# Patient Record
(demographics unavailable — no encounter records)

---

## 2025-02-12 NOTE — HISTORY OF PRESENT ILLNESS
[de-identified] : 2/12/25- AXEL HRENANDEZ is a 54 year old male presenting with lower back pain, no reported injury. Patient reports history of lower back pain that has worsened over time; intermittent N/T down the legs. Was in PT in 2024.

## 2025-02-12 NOTE — IMAGING
[de-identified] : LSPINE Inspection: No rash or ecchymosis Palpation: No tenderness to palpation or spasm in bilateral thoracic and lumbar paraspinal musculature, L SI joint tenderness to palpation ROM: limited all planes Strength: 5/5 bilateral hip flexors, knee extensors, ankle dorsiflexors, EHL, ankle plantarflexors Sensation: Sensation present to light touch bilateral L2-S1 distributions Provocative maneuvers: + L SLR  Bilateral hips- Palpation: No tenderness to palpation over greater trochanter or IT band ROM: No pain with flexion and internal rotation [Disc space narrowing] : Disc space narrowing [AP] : anteroposterior [There are no fractures, subluxations or dislocations. No significant abnormalities are seen] : There are no fractures, subluxations or dislocations. No significant abnormalities are seen

## 2025-02-12 NOTE — ASSESSMENT
[FreeTextEntry1] : PT, meds  Will discuss MRI  NSAIDs- Patient warned of risk of medication to GI tract, increased blood pressure, cardiac risk, and risk of fluid retention.  Advised to clear medication with internist or PCP if any concurrent health problem with heart, blood pressure, or GI system exists.  Gabapentin- Patient advised of sedating effects, instructed not to drive, operate machinery, or take with other sedating medications. Advised of need to taper on/off medication and risk of abruptly stopping gabapentin.

## 2025-03-28 NOTE — HISTORY OF PRESENT ILLNESS
[de-identified] : 2/12/25- AXEL HERNANDEZ is a 54 year old male presenting with lower back pain, no reported injury. Patient reports history of lower back pain that has worsened over time; intermittent N/T down the legs. Was in PT in 2024.  3/28/25 - here for a follow up, sxs still the same. States PT is not helping. Takes medications but it is hurting his stomach.

## 2025-03-28 NOTE — ASSESSMENT
[FreeTextEntry1] : No relief with PT and medication MRI L spine Follow up after MRI  NSAIDs- Patient warned of risk of medication to GI tract, increased blood pressure, cardiac risk, and risk of fluid retention.  Advised to clear medication with internist or PCP if any concurrent health problem with heart, blood pressure, or GI system exists.  Gabapentin- Patient advised of sedating effects, instructed not to drive, operate machinery, or take with other sedating medications. Advised of need to taper on/off medication and risk of abruptly stopping gabapentin.

## 2025-03-28 NOTE — IMAGING
[Disc space narrowing] : Disc space narrowing [AP] : anteroposterior [There are no fractures, subluxations or dislocations. No significant abnormalities are seen] : There are no fractures, subluxations or dislocations. No significant abnormalities are seen [de-identified] : LSPINE Inspection: No rash or ecchymosis Palpation: No tenderness to palpation or spasm in bilateral thoracic and lumbar paraspinal musculature, L SI joint tenderness to palpation ROM: limited all planes Strength: 5/5 bilateral hip flexors, knee extensors, ankle dorsiflexors, EHL, ankle plantarflexors Sensation: Sensation present to light touch bilateral L2-S1 distributions Provocative maneuvers: + L SLR  Bilateral hips- Palpation: No tenderness to palpation over greater trochanter or IT band ROM: No pain with flexion and internal rotation

## 2025-04-18 NOTE — HISTORY OF PRESENT ILLNESS
[de-identified] : 4/10/2025 Lumbar MRI  - report noted in chart.  Ind. 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 Patient reports history of lower back pain that has worsened over time; intermittent N/T down the legs. Was in PT in 2024.  3/28/25 - here for a follow up, sxs still the same. States PT is not helping. Takes medications but it is hurting his stomach.  4/18/25- MRI f/u. Continued pain, N/T down the LLE.   Occ: Construction (Self Employed)

## 2025-04-18 NOTE — ASSESSMENT
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  Advised to clear medication with internist or PCP if any concurrent health problem with heart, blood pressure, or GI system exists.  Gabapentin- Patient advised of sedating effects, instructed not to drive, operate machinery, or take with other sedating medications. Advised of need to taper on/off medication and risk of abruptly stopping gabapentin.

## 2025-04-18 NOTE — IMAGING
[Disc space narrowing] : Disc space narrowing [AP] : anteroposterior [There are no fractures, subluxations or dislocations. No significant abnormalities are seen] : There are no fractures, subluxations or dislocations. No significant abnormalities are seen [de-identified] : LSPINE Inspection: No rash or ecchymosis Palpation: No tenderness to palpation or spasm in bilateral thoracic and lumbar paraspinal musculature, L SI joint tenderness to palpation ROM: limited all planes Strength: 5/5 bilateral hip flexors, knee extensors, ankle dorsiflexors, EHL, ankle plantarflexors Sensation: Sensation present to light touch bilateral L2-S1 distributions Provocative maneuvers: + L SLR  Bilateral hips- Palpation: No tenderness to palpation over greater trochanter or IT band ROM: No pain with flexion and internal rotation

## 2025-05-28 NOTE — HISTORY OF PRESENT ILLNESS
[de-identified] : 4/10/2025 Lumbar MRI  - report noted in chart.  Ind. 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 Patient reports history of lower back pain that has worsened over time; intermittent N/T down the legs. Was in PT in 2024.  3/28/25 - here for a follow up, sxs still the same. States PT is not helping. Takes medications but it is hurting his stomach.  4/18/25- MRI f/u. Continued pain, N/T down the LLE.  5/23/25- here for follow up, continues to have low back and left lower extremity pain. He is taking gabapentin with no significant changes.   Occ: Construction (Self Employed)

## 2025-05-28 NOTE — IMAGING
[Disc space narrowing] : Disc space narrowing [AP] : anteroposterior [There are no fractures, subluxations or dislocations. No significant abnormalities are seen] : There are no fractures, subluxations or dislocations. No significant abnormalities are seen [de-identified] : LSPINE Inspection: No rash or ecchymosis Palpation: No tenderness to palpation or spasm in bilateral thoracic and lumbar paraspinal musculature, L SI joint tenderness to palpation ROM: limited all planes Strength: 5/5 bilateral hip flexors, knee extensors, ankle dorsiflexors, EHL, ankle plantarflexors Sensation: Sensation present to light touch bilateral L2-S1 distributions Provocative maneuvers: + L SLR  Bilateral hips- Palpation: No tenderness to palpation over greater trochanter or IT band ROM: No pain with flexion and internal rotation

## 2025-05-28 NOTE — HISTORY OF PRESENT ILLNESS
[de-identified] : 4/10/2025 Lumbar MRI  - report noted in chart.  Ind. 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 Patient reports history of lower back pain that has worsened over time; intermittent N/T down the legs. Was in PT in 2024.  3/28/25 - here for a follow up, sxs still the same. States PT is not helping. Takes medications but it is hurting his stomach.  4/18/25- MRI f/u. Continued pain, N/T down the LLE.  5/23/25- here for follow up, continues to have low back and left lower extremity pain. He is taking gabapentin with no significant changes.   Occ: Construction (Self Employed)

## 2025-05-28 NOTE — ASSESSMENT
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  Advised to clear medication with internist or PCP if any concurrent health problem with heart, blood pressure, or GI system exists.  Gabapentin- Patient advised of sedating effects, instructed not to drive, operate machinery, or take with other sedating medications. Advised of need to taper on/off medication and risk of abruptly stopping gabapentin.

## 2025-05-28 NOTE — IMAGING
[Disc space narrowing] : Disc space narrowing [AP] : anteroposterior [There are no fractures, subluxations or dislocations. No significant abnormalities are seen] : There are no fractures, subluxations or dislocations. No significant abnormalities are seen [de-identified] : LSPINE Inspection: No rash or ecchymosis Palpation: No tenderness to palpation or spasm in bilateral thoracic and lumbar paraspinal musculature, L SI joint tenderness to palpation ROM: limited all planes Strength: 5/5 bilateral hip flexors, knee extensors, ankle dorsiflexors, EHL, ankle plantarflexors Sensation: Sensation present to light touch bilateral L2-S1 distributions Provocative maneuvers: + L SLR  Bilateral hips- Palpation: No tenderness to palpation over greater trochanter or IT band ROM: No pain with flexion and internal rotation

## 2025-06-04 NOTE — DISCUSSION/SUMMARY
[de-identified] : AXEL HERNANDEZ is a 55 year-old man presenting for a NPV for a history of chronic low back pain.   Prior treatment: Physical therapy x28 sessions in 2025 Celecoxib Gabapentin Meloxicam Patient has participated and failed at least 6 weeks of conservative therapy including physical therapy and a prescribed home exercise program as well as 6 weeks of activity modifications, including heat, ice, rest, and over the counter medications.  Plan: 1) MRI lumbar spine images reviewed with the patient. 2) Discuss LEFT L5-S1, S1 TFESI w/o MAC. The procedure was explained to the patient in detail. Reviewed risks, benefits, and alternatives with the patient. Some risks discussed included temporary increase in pain, bleeding, infection, and side effects from steroids. The patient expressed understanding and would like to defer 2/2 concern for weight gain. 3) Continue celecoxib 200mg daily prn; denies AEs 4) Continue home exercise regimen 5) Patient would like to f/u with Dr. Alonzo to discuss possible discectomy 6) RTC prn

## 2025-06-04 NOTE — PHYSICAL EXAM
[de-identified] : Gen: NAD Head: NC/AT Eyes: no glasses, no scleral icterus ENT: mucous membranes moist CV: No JVD Lungs: nonlabored breathing Abd: soft, NT/ND Ext: full ROM in all extremities, no peripheral edema Back: +TTP in the left low lumbar facet region, +SLR on the LEFT Neuro: CN intact LEs +5 L +5 R hip flexion +5 L +5 R leg extension +5 L +5 R leg flexion +5 L +5 R foot dorsiflexion +5 L +5 R foot plantarflexion +5 L +5 R EHL extension Psych: normal affect Skin: no visible lesions

## 2025-06-04 NOTE — PHYSICAL EXAM
[de-identified] : Gen: NAD Head: NC/AT Eyes: no glasses, no scleral icterus ENT: mucous membranes moist CV: No JVD Lungs: nonlabored breathing Abd: soft, NT/ND Ext: full ROM in all extremities, no peripheral edema Back: +TTP in the left low lumbar facet region, +SLR on the LEFT Neuro: CN intact LEs +5 L +5 R hip flexion +5 L +5 R leg extension +5 L +5 R leg flexion +5 L +5 R foot dorsiflexion +5 L +5 R foot plantarflexion +5 L +5 R EHL extension Psych: normal affect Skin: no visible lesions

## 2025-06-04 NOTE — DATA REVIEWED
[MRI] : MRI [Lumbar Spine] : lumbar spine [Report was reviewed and noted in the chart] : The report was reviewed and noted in the chart [I independently reviewed and interpreted images and report] : I independently reviewed and interpreted images and report [FreeTextEntry1] : 4/10/2025 MRI Lumbar Spine O&C L4-L5: mild disc bulging, mild bilateral facet hypertrophy w/ mild RIGHT > left NF narrowing L5-S1: LEFT paracentral disc protrusion impinging on the LEFT S1 nerve root, moderate LEFT NF narrowing and mild right NF narrowing

## 2025-06-04 NOTE — HISTORY OF PRESENT ILLNESS
[Lower back] : lower back [Left Leg] : left leg [Right Leg] : right leg [5] : 5 [10] : 10 [Dull/Aching] : dull/aching [Radiating] : radiating [Sharp] : sharp [Throbbing] : throbbing [Tightness] : tightness [Tingling] : tingling [Intermittent] : intermittent [Household chores] : household chores [Leisure] : leisure [Sleep] : sleep [Meds] : meds [Standing] : standing [Walking] : walking [Lying in bed] : lying in bed [Physical therapy] : physical therapy [FreeTextEntry1] : 6/4/2025: [employer] (Urdu) Bennett Id # [id number]  AXEL HERNANDEZ is a 55 year-old man presenting for a NPV for a history of chronic low back pain. Patient notes pain in the low back since December 2024. He notes that he has been unable to work due to the pain. 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 Patient did 28 sessions of PT with modest relief and has been prescribed celecoxib, gabapentin, and meloxicam. At this point, the patient notes aching left sided low back pain w/ radiation to the left gluteal region and posterior thigh and leg. Notes tingling and numbness. No weakness.  The patient states that average pain over the past week was 7/10 in severity. Mood:  Sleep: Notes significant difficulty with sleep 2/2 pain.  [] : no [FreeTextEntry6] : numbness and tingling in bilateral legs  [FreeTextEntry7] : Bilateral legs  [FreeTextEntry9] : Celebrex 200mg [de-identified] : x-ray and MRI

## 2025-06-04 NOTE — DISCUSSION/SUMMARY
[de-identified] : AXEL HERNANDEZ is a 55 year-old man presenting for a NPV for a history of chronic low back pain.   Prior treatment: Physical therapy x28 sessions in 2025 Celecoxib Gabapentin Meloxicam Patient has participated and failed at least 6 weeks of conservative therapy including physical therapy and a prescribed home exercise program as well as 6 weeks of activity modifications, including heat, ice, rest, and over the counter medications.  Plan: 1) MRI lumbar spine images reviewed with the patient. 2) Discuss LEFT L5-S1, S1 TFESI w/o MAC. The procedure was explained to the patient in detail. Reviewed risks, benefits, and alternatives with the patient. Some risks discussed included temporary increase in pain, bleeding, infection, and side effects from steroids. The patient expressed understanding and would like to defer 2/2 concern for weight gain. 3) Continue celecoxib 200mg daily prn; denies AEs 4) Continue home exercise regimen 5) Patient would like to f/u with Dr. Alonzo to discuss possible discectomy 6) RTC prn

## 2025-06-04 NOTE — HISTORY OF PRESENT ILLNESS
[Lower back] : lower back [Left Leg] : left leg [Right Leg] : right leg [5] : 5 [10] : 10 [Dull/Aching] : dull/aching [Radiating] : radiating [Sharp] : sharp [Throbbing] : throbbing [Tightness] : tightness [Tingling] : tingling [Intermittent] : intermittent [Household chores] : household chores [Leisure] : leisure [Sleep] : sleep [Meds] : meds [Standing] : standing [Walking] : walking [Lying in bed] : lying in bed [Physical therapy] : physical therapy [FreeTextEntry1] : 6/4/2025: [employer] (Nepali) [facility] Id # [id number]  AXEL HERNANDEZ is a 55 year-old man presenting for a NPV for a history of chronic low back pain. Patient notes pain in the low back since December 2024. He notes that he has been unable to work due to the pain. 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 Patient did 28 sessions of PT with modest relief and has been prescribed celecoxib, gabapentin, and meloxicam. At this point, the patient notes aching left sided low back pain w/ radiation to the left gluteal region and posterior thigh and leg. Notes tingling and numbness. No weakness.  The patient states that average pain over the past week was 7/10 in severity. Mood:  Sleep: Notes significant difficulty with sleep 2/2 pain.  [] : no [FreeTextEntry6] : numbness and tingling in bilateral legs  [FreeTextEntry7] : Bilateral legs  [FreeTextEntry9] : Celebrex 200mg [de-identified] : x-ray and MRI

## 2025-06-27 NOTE — IMAGING
[Disc space narrowing] : Disc space narrowing [AP] : anteroposterior [There are no fractures, subluxations or dislocations. No significant abnormalities are seen] : There are no fractures, subluxations or dislocations. No significant abnormalities are seen [de-identified] : LSPINE Inspection: No rash or ecchymosis Palpation: No tenderness to palpation or spasm in bilateral thoracic and lumbar paraspinal musculature, L SI joint tenderness to palpation ROM: limited all planes Strength: 5/5 bilateral hip flexors, knee extensors, ankle dorsiflexors, EHL, ankle plantarflexors Sensation: Sensation present to light touch bilateral L2-S1 distributions Provocative maneuvers: + L SLR  Bilateral hips- Palpation: No tenderness to palpation over greater trochanter or IT band ROM: No pain with flexion and internal rotation

## 2025-06-27 NOTE — IMAGING
[Disc space narrowing] : Disc space narrowing [AP] : anteroposterior [There are no fractures, subluxations or dislocations. No significant abnormalities are seen] : There are no fractures, subluxations or dislocations. No significant abnormalities are seen [de-identified] : LSPINE Inspection: No rash or ecchymosis Palpation: No tenderness to palpation or spasm in bilateral thoracic and lumbar paraspinal musculature, L SI joint tenderness to palpation ROM: limited all planes Strength: 5/5 bilateral hip flexors, knee extensors, ankle dorsiflexors, EHL, ankle plantarflexors Sensation: Sensation present to light touch bilateral L2-S1 distributions Provocative maneuvers: + L SLR  Bilateral hips- Palpation: No tenderness to palpation over greater trochanter or IT band ROM: No pain with flexion and internal rotation

## 2025-06-27 NOTE — HISTORY OF PRESENT ILLNESS
[de-identified] : 4/10/2025 Lumbar MRI  - report noted in chart.  Ind. 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 Patient reports history of lower back pain that has worsened over time; intermittent N/T down the legs. Was in PT in 2024.  3/28/25 - here for a follow up, sxs still the same. States PT is not helping. Takes medications but it is hurting his stomach.  4/18/25- MRI f/u. Continued pain, N/T down the LLE.  5/23/25- here for follow up, continues to have low back and left lower extremity pain. He is taking gabapentin with no significant changes.  6/25/25- continued pain, attending PT. Taking gabapentin and meloxicam. Saw Dr. Green, deferring injection at this time. Still radiating down posterior LLE to plantar foot. Has had 2-3 JOSEPHINE from mid 2024 with 1-2 weeks of relief.   Occ: Construction (Self Employed)  PMH: Denies PSH: LT UE nerve, LT thigh 1999 SH: None

## 2025-06-27 NOTE — ASSESSMENT
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 There is also a possibility for recurrent or residual disk herniation, failure or fracture of bone, and need for future surgery. Finally, we discussed potential for injury to nerves, the spinal cord either transient or permanent, damage to blood vessels, CSF leak, blindness, need for transfusion, and medical complications. The patient verbalized understanding and all questions were answered.   NSAIDs- Patient warned of risk of medication to GI tract, increased blood pressure, cardiac risk, and risk of fluid retention.  Advised to clear medication with internist or PCP if any concurrent health problem with heart, blood pressure, or GI system exists.  Gabapentin- Patient advised of sedating effects, instructed not to drive, operate machinery, or take with other sedating medications. Advised of need to taper on/off medication and risk of abruptly stopping gabapentin.

## 2025-06-27 NOTE — HISTORY OF PRESENT ILLNESS
[de-identified] : 4/10/2025 Lumbar MRI  - report noted in chart.  Ind. 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 Patient reports history of lower back pain that has worsened over time; intermittent N/T down the legs. Was in PT in 2024.  3/28/25 - here for a follow up, sxs still the same. States PT is not helping. Takes medications but it is hurting his stomach.  4/18/25- MRI f/u. Continued pain, N/T down the LLE.  5/23/25- here for follow up, continues to have low back and left lower extremity pain. He is taking gabapentin with no significant changes.  6/25/25- continued pain, attending PT. Taking gabapentin and meloxicam. Saw Dr. Green, deferring injection at this time. Still radiating down posterior LLE to plantar foot. Has had 2-3 JOSEPHINE from mid 2024 with 1-2 weeks of relief.   Occ: Construction (Self Employed)  PMH: Denies PSH: LT UE nerve, LT thigh 1999 SH: None